# Patient Record
Sex: MALE | Race: WHITE | NOT HISPANIC OR LATINO | Employment: OTHER | URBAN - METROPOLITAN AREA
[De-identification: names, ages, dates, MRNs, and addresses within clinical notes are randomized per-mention and may not be internally consistent; named-entity substitution may affect disease eponyms.]

---

## 2017-01-11 ENCOUNTER — HOSPITAL ENCOUNTER (OUTPATIENT)
Dept: RADIOLOGY | Facility: HOSPITAL | Age: 57
Discharge: HOME | End: 2017-01-11
Attending: FAMILY MEDICINE
Payer: MEDICAID

## 2017-01-11 ENCOUNTER — OFFICE VISIT (OUTPATIENT)
Dept: FAMILY MEDICINE | Facility: HOSPITAL | Age: 57
End: 2017-01-11
Payer: MEDICAID

## 2017-01-11 VITALS
WEIGHT: 106.94 LBS | HEART RATE: 110 BPM | SYSTOLIC BLOOD PRESSURE: 102 MMHG | TEMPERATURE: 99 F | BODY MASS INDEX: 16.21 KG/M2 | DIASTOLIC BLOOD PRESSURE: 62 MMHG | HEIGHT: 68 IN

## 2017-01-11 DIAGNOSIS — M54.9 BACK PAIN, UNSPECIFIED BACK LOCATION, UNSPECIFIED BACK PAIN LATERALITY, UNSPECIFIED CHRONICITY: ICD-10-CM

## 2017-01-11 DIAGNOSIS — J43.9 PULMONARY EMPHYSEMA, UNSPECIFIED EMPHYSEMA TYPE: Chronic | ICD-10-CM

## 2017-01-11 DIAGNOSIS — R05.8 COUGH WITH SPUTUM: ICD-10-CM

## 2017-01-11 DIAGNOSIS — R05.8 COUGH WITH SPUTUM: Primary | ICD-10-CM

## 2017-01-11 DIAGNOSIS — F41.9 ANXIETY: ICD-10-CM

## 2017-01-11 PROCEDURE — 99213 OFFICE O/P EST LOW 20 MIN: CPT | Performed by: FAMILY MEDICINE

## 2017-01-11 PROCEDURE — 71020 XR CHEST PA AND LATERAL: CPT | Mod: 26,,, | Performed by: RADIOLOGY

## 2017-01-11 PROCEDURE — 71020 XR CHEST PA AND LATERAL: CPT | Mod: TC

## 2017-01-11 RX ORDER — HYDROMORPHONE HYDROCHLORIDE 4 MG/1
4 TABLET ORAL EVERY 6 HOURS PRN
Qty: 115 TABLET | Refills: 0 | Status: SHIPPED | OUTPATIENT
Start: 2017-01-11 | End: 2017-02-01 | Stop reason: SDUPTHER

## 2017-01-11 RX ORDER — HYDROXYZINE PAMOATE 50 MG/1
50 CAPSULE ORAL EVERY 8 HOURS PRN
Qty: 60 CAPSULE | Refills: 1 | Status: SHIPPED | OUTPATIENT
Start: 2017-01-11

## 2017-01-11 RX ORDER — ALBUTEROL SULFATE 90 UG/1
AEROSOL, METERED RESPIRATORY (INHALATION)
Qty: 18 G | Refills: 0 | Status: SHIPPED | OUTPATIENT
Start: 2017-01-11

## 2017-01-11 RX ORDER — DOXYCYCLINE 100 MG/1
100 CAPSULE ORAL 2 TIMES DAILY
Qty: 20 CAPSULE | Refills: 0 | Status: SHIPPED | OUTPATIENT
Start: 2017-01-11 | End: 2017-01-21

## 2017-01-11 RX ORDER — OMEPRAZOLE 40 MG/1
40 CAPSULE, DELAYED RELEASE ORAL DAILY
Qty: 90 CAPSULE | Refills: 3 | Status: SHIPPED | OUTPATIENT
Start: 2017-01-11 | End: 2018-01-11

## 2017-01-11 RX ORDER — HYDROMORPHONE HYDROCHLORIDE 4 MG/1
4 TABLET ORAL EVERY 6 HOURS PRN
Qty: 115 TABLET | Refills: 0 | Status: SHIPPED | OUTPATIENT
Start: 2017-01-11 | End: 2017-01-11 | Stop reason: SDUPTHER

## 2017-01-11 NOTE — PROGRESS NOTES
Subjective:       Patient ID: Sherif Simon is a 56 y.o. male.    Chief Complaint: Medication Refill    HPI Comments: Patient is a 55yo WM with a history ofCOPD, chronic back pain and tonsillar cancer s/p surgical resection (now in remission) presents to clinic for medication refill and chest congestion. He has been maintained on PO dilaudid for many years now. Our clinic has started to wean Pt off of his chronic pain regimen. Pt states his chronic pain is worse now since he has started weaning and broke his hip last year.     He reports chest congestion and productive cough with green phlegm for 7 days. Denies fevers or other sick contacts. He is still drinking at least a pint of alcohol a day and smoking at least 1 ppd.       Medication Refill   Associated symptoms include arthralgias, congestion and coughing. Pertinent negatives include no abdominal pain, chest pain, chills, fever, headaches or sore throat.     Review of Systems   Constitutional: Negative for chills and fever.   HENT: Positive for congestion. Negative for rhinorrhea and sore throat.    Eyes: Negative for visual disturbance.   Respiratory: Positive for cough and shortness of breath. Negative for wheezing.    Cardiovascular: Negative for chest pain.   Gastrointestinal: Negative for abdominal pain.   Endocrine: Negative for polyuria.   Genitourinary: Negative for dysuria.   Musculoskeletal: Positive for arthralgias. Negative for back pain.   Skin: Negative for color change.   Neurological: Negative for headaches.   Psychiatric/Behavioral: Negative for agitation and confusion.       Objective:      Vitals:    01/11/17 1051   BP: 102/62   Pulse: 110   Temp: 98.8 °F (37.1 °C)     Physical Exam   Constitutional: He is oriented to person, place, and time. He appears well-developed. He appears cachectic. He has a sickly appearance.   Smelled of EtOH   HENT:   Head: Normocephalic and atraumatic.   Right Ear: External ear normal.   Left Ear: External ear  normal.   Eyes: EOM are normal. Right eye exhibits no discharge. Left eye exhibits no discharge.   Bilateral arcus senilis    Neck: No thyromegaly present.   Tracheostomy tube in place   Cardiovascular: Normal rate, regular rhythm, normal heart sounds and intact distal pulses.    Pulmonary/Chest: Effort normal. He has rhonchi (Throughout). He has rales (Throughout).   Abdominal: Soft. He exhibits no distension.       Musculoskeletal: Normal range of motion.   Neurological: He is alert and oriented to person, place, and time.   Skin: Skin is warm and dry.   Psychiatric: He has a normal mood and affect. His behavior is normal. Judgment and thought content normal.   Nursing note and vitals reviewed.      Assessment:       1. Cough with sputum        Plan:       Cough with sputum  -     X-Ray Chest PA And Lateral; Future; Expected date: 1/11/17    Other orders  -     doxycycline (VIBRAMYCIN) 100 MG Cap; Take 1 capsule (100 mg total) by mouth 2 (two) times daily.  Dispense: 20 capsule; Refill: 0    Referral to ENT  Referral to GI  Referral to Oncology    Needs to set up colonoscopy.   No Follow-up on file.

## 2017-01-16 ENCOUNTER — HOSPITAL ENCOUNTER (OUTPATIENT)
Dept: RADIOLOGY | Facility: HOSPITAL | Age: 57
Discharge: HOME | End: 2017-01-16
Attending: FAMILY MEDICINE
Payer: MEDICAID

## 2017-01-16 DIAGNOSIS — C09.0 CANCER OF TONSILLAR FOSSA: ICD-10-CM

## 2017-01-16 DIAGNOSIS — R79.89 ELEVATED TSH: ICD-10-CM

## 2017-01-16 PROCEDURE — 76536 US EXAM OF HEAD AND NECK: CPT | Mod: 26,,, | Performed by: RADIOLOGY

## 2017-01-16 PROCEDURE — 76536 US EXAM OF HEAD AND NECK: CPT | Mod: TC

## 2017-02-01 ENCOUNTER — OFFICE VISIT (OUTPATIENT)
Dept: FAMILY MEDICINE | Facility: HOSPITAL | Age: 57
End: 2017-02-01
Attending: FAMILY MEDICINE
Payer: MEDICAID

## 2017-02-01 VITALS
DIASTOLIC BLOOD PRESSURE: 61 MMHG | BODY MASS INDEX: 15.94 KG/M2 | WEIGHT: 105.19 LBS | HEART RATE: 91 BPM | SYSTOLIC BLOOD PRESSURE: 94 MMHG | HEIGHT: 68 IN

## 2017-02-01 DIAGNOSIS — Z43.0 TRACHEOSTOMY CARE: ICD-10-CM

## 2017-02-01 DIAGNOSIS — J44.9 CHRONIC OBSTRUCTIVE PULMONARY DISEASE, UNSPECIFIED COPD TYPE: Primary | ICD-10-CM

## 2017-02-01 DIAGNOSIS — M54.9 BACK PAIN, UNSPECIFIED BACK LOCATION, UNSPECIFIED BACK PAIN LATERALITY, UNSPECIFIED CHRONICITY: ICD-10-CM

## 2017-02-01 PROCEDURE — 99213 OFFICE O/P EST LOW 20 MIN: CPT | Performed by: FAMILY MEDICINE

## 2017-02-01 RX ORDER — TIOTROPIUM BROMIDE 18 UG/1
18 CAPSULE ORAL; RESPIRATORY (INHALATION) DAILY
Qty: 360 CAPSULE | Refills: 11 | Status: SHIPPED | OUTPATIENT
Start: 2017-02-01 | End: 2017-02-01

## 2017-02-01 RX ORDER — CETIRIZINE HYDROCHLORIDE 10 MG/1
10 TABLET ORAL DAILY
Qty: 30 TABLET | Refills: 11 | Status: SHIPPED | OUTPATIENT
Start: 2017-02-01 | End: 2018-02-01

## 2017-02-01 RX ORDER — FLUTICASONE FUROATE AND VILANTEROL 200; 25 UG/1; UG/1
1 POWDER RESPIRATORY (INHALATION) DAILY
Qty: 30 EACH | Refills: 11 | Status: SHIPPED | OUTPATIENT
Start: 2017-02-01

## 2017-02-01 RX ORDER — ZOLPIDEM TARTRATE 5 MG/1
5 TABLET ORAL NIGHTLY
Refills: 0 | COMMUNITY
Start: 2017-01-20

## 2017-02-01 RX ORDER — HYDROMORPHONE HYDROCHLORIDE 4 MG/1
4 TABLET ORAL EVERY 6 HOURS PRN
Qty: 100 TABLET | Refills: 0 | Status: SHIPPED | OUTPATIENT
Start: 2017-02-01

## 2017-02-01 NOTE — PROGRESS NOTES
Subjective:       Patient ID: Sherif Simon is a 56 y.o. male.    Chief Complaint: No chief complaint on file.    HPI Comments: Pt is a 57 y/o M with PMHx of COPD and tonsillar cancer (s/p resection, currently in remission) who presents to clinic for congestion. He was last seen in clinic 3 weeks ago and prescribed Doxycycline for outpatient pneumonia treatment. The pt states that he completed the antibiotic course without much improvement in sxs. He reports a productive cough with small amounts of green phlegm and mild SOB. He denies fever, chills, or chest pain. Additionally, the pt c/o severe back and bilateral arm pain that he attributes to a recent fall. He denies dizziness or syncope and reports that the fall was mechanical in nature. He has no further complaints at this time.    Review of Systems   Constitutional: Negative for chills and fever.   Respiratory: Positive for cough.    Cardiovascular: Negative for chest pain and palpitations.   Gastrointestinal: Negative for abdominal distention, abdominal pain, nausea and vomiting.   Musculoskeletal: Positive for arthralgias and back pain.   Neurological: Negative for dizziness, syncope and headaches.       Objective:      Vitals:    02/01/17 1344   BP: 94/61   Pulse: 91     Physical Exam   Constitutional: He is oriented to person, place, and time.   Cachectic   HENT:   Head: Normocephalic and atraumatic.   Eyes: Pupils are equal, round, and reactive to light.   Neck:   Tracheostomy tube in place   Cardiovascular: Normal rate, regular rhythm and normal heart sounds.    Pulmonary/Chest: Effort normal. He has wheezes (mild expiratory).   Abdominal: Soft. Bowel sounds are normal. He exhibits no distension. There is no tenderness.   Musculoskeletal: Normal range of motion.   Neurological: He is alert and oriented to person, place, and time.       Assessment:       1. Chronic obstructive pulmonary disease, unspecified COPD type    2. Tracheostomy care    3. Back pain,  unspecified back location, unspecified back pain laterality, unspecified chronicity        Plan:       Chronic obstructive pulmonary disease, unspecified COPD type  -     fluticasone-vilanterol (BREO) 200-25 mcg/dose DsDv diskus inhaler; Inhale 1 puff into the lungs once daily. Controller  Dispense: 30 each; Refill: 11  -     cetirizine (ZYRTEC) 10 MG tablet; Take 1 tablet (10 mg total) by mouth once daily.  Dispense: 30 tablet; Refill: 11  -     Discontinue: tiotropium (SPIRIVA) 18 mcg inhalation capsule; Inhale 1 capsule (18 mcg total) into the lungs once daily. Controller  Dispense: 360 capsule; Refill: 11  -     Ambulatory referral to Pulmonology  - Pt prescribed Breo for chronic COPD management. Pt also given Cetirizine (antihistamine) for improvement of congestion and cough.  - Pt was previously scheduled for Pulmonology appointment that he missed. Pt told to call and re-schedule this appointment for further assessment of his COPD.    Tracheostomy care  -     Ambulatory referral to ENT  - Pt has not had tracheostomy tube evaluated by ENT for years. Pt referred to ENT.    Back pain, unspecified back location, unspecified back pain laterality, unspecified chronicity  -     HYDROmorphone (DILAUDID) 4 MG tablet; Take 1 tablet (4 mg total) by mouth every 6 (six) hours as needed for Pain.  Dispense: 100 tablet; Refill: 0  - Pt reports to clinic 10 days early for refill of Dilaudid 2/2 to recent fall. Will continue to wean patient on this medication. Pt also instructed to attend Pulm or ENT visit as it will be deemed inappropriate to continue administering  pain medication without appointment compliance. Discussed importance of having his current comorbidities formally assessed (tonsillar cancer in remission and COPD).    Other orders  -     umeclidinium (INCRUSE ELLIPTA) 62.5 mcg/actuation DsDv; Inhale 62.5 mcg into the lungs once daily.  Dispense: 30 each; Refill: 11  - Refilled pt's Incruse Ellipta medication.  Continue taking as prescribed.      No Follow-up on file.

## 2017-02-06 NOTE — PROGRESS NOTES
I assume primary medical responsibility for this patient, I have reviewed the case history, findings, diagnosis and treatment plan with the resident and agree that the care is reasonable and necessary. This service has been performed by a resident without the presence of a teaching physician under the primary care exception  Camille Herman  2/6/2017

## 2017-02-09 ENCOUNTER — TELEPHONE (OUTPATIENT)
Dept: SMOKING CESSATION | Facility: CLINIC | Age: 57
End: 2017-02-09

## 2017-02-09 NOTE — TELEPHONE ENCOUNTER
Called pt to reschedule appointment with smoking cessation. Pt stated he forgot, but would like to reschedule for 1/16/17 at 3 pm. Pt was given address and call back number to clinic.

## 2017-02-16 ENCOUNTER — TELEPHONE (OUTPATIENT)
Dept: SMOKING CESSATION | Facility: CLINIC | Age: 57
End: 2017-02-16

## 2017-02-16 NOTE — TELEPHONE ENCOUNTER
Called pt to reschedule his missed appointment with smoking cessation. Pt stated he forgot he had a doctor's appointment. Pt rescheduled for 2/24/17 at 3 pm.